# Patient Record
(demographics unavailable — no encounter records)

---

## 2024-11-07 NOTE — HISTORY OF PRESENT ILLNESS
[de-identified] : 5 year old male with multiple food and environmental allergies. Presents for evaluation of enlarged tonsils and snoring. ++Growth trouble Parents state that Louis has enlarged tonsils at baseline. No recurrent strep. Rare instances of difficulty swallowing if having too much food at once. Otherwise - eating and drinking normally.  Intermittent heavy snoring. Denies pauses or gasps in breathing. Parents note restless sleep and poor sleep quality. No concerns with nocturnal enuresis or concentration. Often with nasal congestion, occasional mucus.  Taking Claritin daily.  Occasionally wakes up dry blood in his nose.  PCP recommended children's Afrin to use PRN for nasal congestion.  Denies recurrent ENT infections. Reports difficulty gaining weight. Denies issues with hearing or speech.

## 2024-11-07 NOTE — PHYSICAL EXAM
[Clear to Auscultation] : lungs were clear to auscultation bilaterally [Normal Gait and Station] : normal gait and station [Normal muscle strength, symmetry and tone of facial, head and neck musculature] : normal muscle strength, symmetry and tone of facial, head and neck musculature [Normal] : no cervical lymphadenopathy [4+] : 4+ [Effusion] : no effusion [Exposed Vessel] : left anterior vessel not exposed [Wheezing] : no wheezing [Increased Work of Breathing] : no increased work of breathing with use of accessory muscles and retractions

## 2024-11-07 NOTE — CONSULT LETTER
[Dear  ___] : Dear  [unfilled], [Consult Letter:] : I had the pleasure of evaluating your patient, [unfilled]. [Please see my note below.] : Please see my note below. [Consult Closing:] : Thank you very much for allowing me to participate in the care of this patient.  If you have any questions, please do not hesitate to contact me. [Sincerely,] : Sincerely, [FreeTextEntry2] : Dr. Sesar López [FreeTextEntry3] : Jordan Concepcion MD, PhD Chief, Division of Laryngology Department of Otolaryngology [facility] Pediatric Otolaryngology, [facility]  of Otolaryngology [facility]

## 2024-11-07 NOTE — ADDENDUM
[FreeTextEntry1] :   Documented by Lincoln Armstrong acting as scribe for Dr. Concepcion on 11/07/2024. All Medical record entries made by the Scribe were at my, Dr. Concepcion, direction and personally dictated by me on 11/07/2024 . I have reviewed the chart and agree that the record accurately reflects my personal performance of the history, physical exam, assessment and plan. I have also personally directed, reviewed, and agreed with the discharge instructions.

## 2024-11-07 NOTE — HISTORY OF PRESENT ILLNESS
[de-identified] : 5 year old male with multiple food and environmental allergies. Presents for evaluation of enlarged tonsils and snoring. ++Growth trouble Parents state that Louis has enlarged tonsils at baseline. No recurrent strep. Rare instances of difficulty swallowing if having too much food at once. Otherwise - eating and drinking normally.  Intermittent heavy snoring. Denies pauses or gasps in breathing. Parents note restless sleep and poor sleep quality. No concerns with nocturnal enuresis or concentration. Often with nasal congestion, occasional mucus.  Taking Claritin daily.  Occasionally wakes up dry blood in his nose.  PCP recommended children's Afrin to use PRN for nasal congestion.  Denies recurrent ENT infections. Reports difficulty gaining weight. Denies issues with hearing or speech.

## 2024-11-07 NOTE — REASON FOR VISIT
[Initial Consultation] : an initial consultation for [Parents] : parents [FreeTextEntry2] : enlarged tonsils and snoring

## 2024-11-07 NOTE — CONSULT LETTER
[Dear  ___] : Dear  [unfilled], [Consult Letter:] : I had the pleasure of evaluating your patient, [unfilled]. [Please see my note below.] : Please see my note below. [Consult Closing:] : Thank you very much for allowing me to participate in the care of this patient.  If you have any questions, please do not hesitate to contact me. [Sincerely,] : Sincerely, [FreeTextEntry2] : Dr. Sesar López [FreeTextEntry3] : Jordan Concepcion MD, PhD Chief, Division of Laryngology Department of Otolaryngology [facility] Pediatric Otolaryngology, [facility]  of Otolaryngology [facility] of [facility]

## 2025-03-06 NOTE — REASON FOR VISIT
[Subsequent Evaluation] : a subsequent evaluation for [Parents] : parents [FreeTextEntry2] : enlarged tonsils and snoring

## 2025-03-06 NOTE — CONSULT LETTER
[Dear  ___] : Dear  [unfilled], [Consult Letter:] : I had the pleasure of evaluating your patient, [unfilled]. [Please see my note below.] : Please see my note below. [Consult Closing:] : Thank you very much for allowing me to participate in the care of this patient.  If you have any questions, please do not hesitate to contact me. [Sincerely,] : Sincerely, [FreeTextEntry2] : Dr. Sesar López [FreeTextEntry3] : Jordan Concepcion MD, PhD Chief, Division of Laryngology Department of Otolaryngology [facility] Pediatric Otolaryngology, [facility]  of Otolaryngology [facility] of [facility]

## 2025-03-06 NOTE — PHYSICAL EXAM
[Clear to Auscultation] : lungs were clear to auscultation bilaterally [Normal Gait and Station] : normal gait and station [Normal muscle strength, symmetry and tone of facial, head and neck musculature] : normal muscle strength, symmetry and tone of facial, head and neck musculature [Normal] : no cervical lymphadenopathy [Surgically Absent] : surgically absent [Effusion] : no effusion [Exposed Vessel] : left anterior vessel not exposed [Wheezing] : no wheezing [Increased Work of Breathing] : no increased work of breathing with use of accessory muscles and retractions

## 2025-03-06 NOTE — HISTORY OF PRESENT ILLNESS
[de-identified] : 4 y/o M presents for post op follow up s/p Tonsillectomy and adenoidectomy 02/18/25 Sleep quality has improved.  No noteable snoring concerns.  Reports nasal congestion has improved.  Post surgery pain has now resolved.  Denies dysphagia, aspiration, dyspnea Denies recent fevers or infections.